# Patient Record
Sex: FEMALE | Race: WHITE | NOT HISPANIC OR LATINO | Employment: FULL TIME | URBAN - METROPOLITAN AREA
[De-identification: names, ages, dates, MRNs, and addresses within clinical notes are randomized per-mention and may not be internally consistent; named-entity substitution may affect disease eponyms.]

---

## 2021-04-26 ENCOUNTER — ANNUAL EXAM (OUTPATIENT)
Dept: OBGYN CLINIC | Facility: CLINIC | Age: 51
End: 2021-04-26
Payer: COMMERCIAL

## 2021-04-26 VITALS
WEIGHT: 153 LBS | HEIGHT: 67 IN | DIASTOLIC BLOOD PRESSURE: 60 MMHG | BODY MASS INDEX: 24.01 KG/M2 | SYSTOLIC BLOOD PRESSURE: 100 MMHG

## 2021-04-26 DIAGNOSIS — Z12.4 CERVICAL CANCER SCREENING: ICD-10-CM

## 2021-04-26 DIAGNOSIS — Z12.31 ENCOUNTER FOR SCREENING MAMMOGRAM FOR MALIGNANT NEOPLASM OF BREAST: ICD-10-CM

## 2021-04-26 DIAGNOSIS — Z01.419 ROUTINE GYNECOLOGICAL EXAMINATION: Primary | ICD-10-CM

## 2021-04-26 DIAGNOSIS — Z30.431 SURVEILLANCE OF INTRAUTERINE CONTRACEPTIVE DEVICE: ICD-10-CM

## 2021-04-26 DIAGNOSIS — Z80.3 FAMILY HISTORY OF BREAST CANCER IN FIRST DEGREE RELATIVE: ICD-10-CM

## 2021-04-26 PROCEDURE — S0612 ANNUAL GYNECOLOGICAL EXAMINA: HCPCS | Performed by: OBSTETRICS & GYNECOLOGY

## 2021-04-26 RX ORDER — MESALAMINE 1.2 G/1
1200 TABLET, DELAYED RELEASE ORAL
COMMUNITY

## 2021-04-26 RX ORDER — TRIAMCINOLONE ACETONIDE 1 MG/G
CREAM TOPICAL
COMMUNITY
Start: 2021-04-07

## 2021-04-26 RX ORDER — CHOLECALCIFEROL (VITAMIN D3) 1250 MCG
1 CAPSULE ORAL WEEKLY
COMMUNITY
Start: 2021-03-29

## 2021-04-26 NOTE — PROGRESS NOTES
Pt w  IUD   No menses  Some night sweats, no vaginal dryness  Mammogram  WNL  +  Wt gain in abdominal area   [address] [facility], [address], [facility], [address]    ASSESSMENT/PLAN: Bunny Clinton is a 52 y o   who presents for annual gynecologic exam     Encounter for routine gynecologic examination  - Routine well woman exam completed today  - Cervical Cancer Screening: Current ASCCP Guidelines reviewed  Last Pap: 2018   Next Pap Due: today  - HPV Vaccination status: Not immunized  - STI screening offered including HIV testing:   - Contraceptive counseling discussed  Current  IUD liletta    - Breast Cancer Screening: Last Mammogram 3/2021 per pt  - Colorectal cancer screening was not ordered  - The following were reviewed in today's visit: breast self exam, mammography screening ordered, menopause, exercise and healthy diet    Additional problems addressed during this visit:  1  Routine gynecological examination    2  Encounter for screening mammogram for malignant neoplasm of breast  -     Mammo screening bilateral w 3d & cad; Future; Expected date: 2022    3  Family history of breast cancer in first degree relative    4  Surveillance of intrauterine contraceptive device    5  Cervical cancer screening  -     IGP, Aptima HPV, Rfx 16/18,45    Sister w  Breast  Cancer  tx at  [facility] , Bilateral mastectomy, negative genetic screening patient aware of ability to have genetic screening  IUD in place no menses patient not sure she has menopause encouraged to leave the IUD in  Breast awareness healthy diet and exercise  CC:  Annual Gynecologic Examination    HPI: Bunny Clinton is a 52 y o   who presents for annual gynecologic examination  HPI    The following portions of the patient's history were reviewed and updated as appropriate: She  has a past medical history of Breast cyst, Papanicolaou smear (2018), Psoriasis, and Ulcerative colitis ([facility] [address] )    She has a past surgical history that includes Mammo (historical) (3/18/2021, 3/10/2020); Colonoscopy (2017); and Uterine fibroid surgery  Her family history includes Breast cancer (age of onset: 43) in her sister  She  reports that she has never smoked  She has never used smokeless tobacco  She reports current alcohol use  She reports that she does not use drugs  Current Outpatient Medications   Medication Sig Dispense Refill    Cholecalciferol (Vitamin D3) 1 25 MG (89545 UT) CAPS Take 1 capsule by mouth once a week      Levonorgestrel (LILETTA) 19 5 MCG/DAY IUD IUD 1 each by Intrauterine route once      mesalamine (LIALDA) 1 2 g EC tablet Take 1,200 mg by mouth daily with breakfast      triamcinolone (KENALOG) 0 1 % cream APPLY TO AFFECTED AREAS TWICE A DAY X 2 WEEKS AS NEEDED       No current facility-administered medications for this visit  She has No Known Allergies       Review of Systems:  All systems normal except as noted in HPI          Objective:  /60   Ht 5' 7" (1 702 m)   Wt 69 4 kg (153 lb)   BMI 23 96 kg/m²    Physical Exam  Vitals signs and nursing note reviewed  Constitutional:       Appearance: Normal appearance  HENT:      Head: Normocephalic  Neck:      Musculoskeletal: Neck supple  Cardiovascular:      Rate and Rhythm: Normal rate and regular rhythm  Pulmonary:      Effort: Pulmonary effort is normal       Breath sounds: Normal breath sounds  Abdominal:      General: Abdomen is flat  Bowel sounds are normal       Palpations: Abdomen is soft  Genitourinary:     General: Normal vulva  Vagina: Normal       Cervix: Normal       Uterus: Normal        Adnexa: Right adnexa normal       Rectum: Normal       Comments: IUD string in OS  Musculoskeletal: Normal range of motion  Skin:     General: Skin is warm and dry  Neurological:      Mental Status: She is alert     Psychiatric:         Mood and Affect: Mood normal

## 2021-04-29 LAB
CYTOLOGIST CVX/VAG CYTO: NORMAL
DX ICD CODE: NORMAL
HPV I/H RISK 4 DNA CVX QL PROBE+SIG AMP: NEGATIVE
OTHER STN SPEC: NORMAL
PATH REPORT.FINAL DX SPEC: NORMAL
SL AMB NOTE:: NORMAL
SL AMB SPECIMEN ADEQUACY: NORMAL
SL AMB TEST METHODOLOGY: NORMAL

## 2021-08-18 ENCOUNTER — VBI (OUTPATIENT)
Dept: ADMINISTRATIVE | Facility: OTHER | Age: 51
End: 2021-08-18

## 2021-08-18 NOTE — TELEPHONE ENCOUNTER
Upon review of the In Basket request we were able to locate, review, and update the patient chart as requested for Mammogram     Any additional questions or concerns should be emailed to the Practice Liaisons via [email or web address] email, please do not reply via In Basket      Thank you  Tarik Estrada

## 2023-12-05 ENCOUNTER — TELEPHONE (OUTPATIENT)
Dept: OBGYN CLINIC | Facility: CLINIC | Age: 53
End: 2023-12-05

## 2023-12-05 DIAGNOSIS — Z12.31 ENCOUNTER FOR SCREENING MAMMOGRAM FOR MALIGNANT NEOPLASM OF BREAST: Primary | ICD-10-CM

## 2023-12-05 NOTE — TELEPHONE ENCOUNTER
Patient left v/m requesting a mammogram before her next annual appointment on 2/26/24. Mammogram order placed. Spoke with patient and informed her order is placed and she denies any breast problems at this time. She verbalized understanding and requests to have mammogram sent to [facility]. Faxed script to [facility] scheduling. Received fax confirmation.

## 2024-01-15 DIAGNOSIS — Z12.31 ENCOUNTER FOR SCREENING MAMMOGRAM FOR MALIGNANT NEOPLASM OF BREAST: ICD-10-CM

## 2024-02-26 ENCOUNTER — ANNUAL EXAM (OUTPATIENT)
Dept: OBGYN CLINIC | Facility: CLINIC | Age: 54
End: 2024-02-26
Payer: COMMERCIAL

## 2024-02-26 VITALS
WEIGHT: 153 LBS | HEIGHT: 67 IN | SYSTOLIC BLOOD PRESSURE: 116 MMHG | DIASTOLIC BLOOD PRESSURE: 70 MMHG | BODY MASS INDEX: 24.01 KG/M2

## 2024-02-26 DIAGNOSIS — Z12.31 ENCOUNTER FOR SCREENING MAMMOGRAM FOR MALIGNANT NEOPLASM OF BREAST: ICD-10-CM

## 2024-02-26 DIAGNOSIS — Z01.419 ENCOUNTER FOR GYNECOLOGICAL EXAMINATION WITHOUT ABNORMAL FINDING: Primary | ICD-10-CM

## 2024-02-26 DIAGNOSIS — Z30.09 CONTRACEPTIVE EDUCATION: ICD-10-CM

## 2024-02-26 DIAGNOSIS — Z80.3 FAMILY HISTORY OF BREAST CANCER: ICD-10-CM

## 2024-02-26 DIAGNOSIS — Z30.431 IUD CHECK UP: ICD-10-CM

## 2024-02-26 DIAGNOSIS — Z12.4 SCREENING FOR CERVICAL CANCER: ICD-10-CM

## 2024-02-26 PROCEDURE — S0612 ANNUAL GYNECOLOGICAL EXAMINA: HCPCS | Performed by: OBSTETRICS & GYNECOLOGY

## 2024-02-26 NOTE — PROGRESS NOTES
[facility] - [address], [address], [place] [address]    ASSESSMENT/PLAN: Katherine Borjas is a 53 y.o.  who presents for annual gynecologic exam.    Encounter for routine gynecologic examination  - Routine well woman exam completed today.  - Cervical Cancer Screening: Current ASCCP Guidelines reviewed. Last Pap: 2021 . Next Pap Due: today   - Contraceptive counseling discussed.  Current contraception: none:liletta  2019   - Breast Cancer Screening: Last Mammogram 01/10/2024,   - Colorectal cancer screening was not ordered. One year    - The following were reviewed in today's visit: breast self exam, mammography screening ordered, adequate intake of calcium and vitamin D, exercise, healthy diet, and colonoscopy discussed and ordered    Additional problems addressed during this visit:  1. Encounter for gynecological examination without abnormal finding    2. Encounter for screening mammogram for malignant neoplasm of breast  -     Mammo screening bilateral w 3d & cad; Future    3. Screening for cervical cancer  -     IGP, Aptima HPV, Rfx 16/18,45    4. Contraceptive education    5. IUD check up  Comments:  iud string in os no bleeding can  remain in till  [id number]    6. Family history of breast cancer  Comments:  sister neg  genetics pt aware of availability of genetics        CC:  Annual Gynecologic Examination    HPI: Katherine Borjas is a 53 y.o.  who presents for annual gynecologic examination.  52 yo  here for well woman exam.  Hx of endometrial ablation.   No bleeding  liletta iud in .  Dw pt  to   leave in place until    and to report increase in bleeding.       The following portions of the patient's history were reviewed and updated as appropriate: She  has a past medical history of Breast cyst, Papanicolaou smear (), Psoriasis, and Ulcerative colitis (HCC).  She  has a past surgical history that includes Mammo (historical); Colonoscopy (2017); and Uterine  "fibroid surgery.  Her family history includes Breast cancer (age of onset: 42) in her sister.  She  reports that she has never smoked. She has never used smokeless tobacco. She reports current alcohol use. She reports that she does not use drugs.  Current Outpatient Medications   Medication Sig Dispense Refill   • Cholecalciferol (Vitamin D3) 1.25 MG (78493 UT) CAPS Take 1 capsule by mouth once a week     • Levonorgestrel (LILETTA) 19.5 MCG/DAY IUD IUD 1 each by Intrauterine route once     • mesalamine (LIALDA) 1.2 g EC tablet Take 1,200 mg by mouth daily with breakfast     • triamcinolone (KENALOG) 0.1 % cream APPLY TO AFFECTED AREAS TWICE A DAY X 2 WEEKS AS NEEDED (Patient not taking: Reported on 2/26/2024)       No current facility-administered medications for this visit.     She has No Known Allergies..    Review of Systems:  All systems normal except as noted in HPI          Objective:  /70 (BP Location: Left arm, Patient Position: Sitting, Cuff Size: Standard)   Ht 5' 7\" (1.702 m)   Wt 69.4 kg (153 lb)   BMI 23.96 kg/m²    Physical Exam  Vitals and nursing note reviewed.   Constitutional:       Appearance: Normal appearance.   HENT:      Head: Normocephalic.   Cardiovascular:      Rate and Rhythm: Normal rate and regular rhythm.   Pulmonary:      Effort: Pulmonary effort is normal.      Breath sounds: Normal breath sounds.   Chest:      Chest wall: No mass, lacerations, swelling, tenderness or edema.   Breasts:     Elvin Score is 4.      Breasts are symmetrical.      Right: Normal. No swelling, bleeding, inverted nipple, mass, nipple discharge, skin change or tenderness.      Left: No swelling, bleeding, inverted nipple, mass, nipple discharge, skin change or tenderness.   Abdominal:      General: Abdomen is flat. Bowel sounds are normal.      Palpations: Abdomen is soft.   Genitourinary:     General: Normal vulva.      Exam position: Lithotomy position.      Pubic Area: No rash.       Elvin stage " (genital): 4.      Labia:         Right: No rash, tenderness or lesion.         Left: No rash, tenderness or lesion.       Urethra: No urethral pain, urethral swelling or urethral lesion.      Vagina: Normal.      Cervix: Normal. No cervical motion tenderness or discharge.      Uterus: Normal.       Adnexa: Right adnexa normal and left adnexa normal.      Rectum: Normal.      Comments: Iud string in os    Musculoskeletal:         General: Normal range of motion.      Cervical back: Neck supple.   Lymphadenopathy:      Upper Body:      Right upper body: No supraclavicular, axillary or pectoral adenopathy.      Left upper body: No supraclavicular, axillary or pectoral adenopathy.      Lower Body: No right inguinal adenopathy. No left inguinal adenopathy.   Skin:     General: Skin is warm and dry.   Neurological:      Mental Status: She is alert.   Psychiatric:         Mood and Affect: Mood normal.

## 2024-03-06 LAB
CYTOLOGIST CVX/VAG CYTO: ABNORMAL
DX ICD CODE: ABNORMAL
HPV GENOTYPE REFLEX: ABNORMAL
HPV I/H RISK 4 DNA CVX QL PROBE+SIG AMP: POSITIVE
HPV16 DNA CVX QL PROBE+SIG AMP: POSITIVE
HPV18+45 E6+E7 MRNA CVX QL NAA+PROBE: NEGATIVE
Lab: ABNORMAL
OTHER STN SPEC: ABNORMAL
PATH REPORT.FINAL DX SPEC: ABNORMAL
SL AMB NOTE:: ABNORMAL
SL AMB SPECIMEN ADEQUACY: ABNORMAL
SL AMB TEST METHODOLOGY: ABNORMAL

## 2024-04-26 PROBLEM — Z12.4 SCREENING FOR CERVICAL CANCER: Status: RESOLVED | Noted: 2024-02-26 | Resolved: 2024-04-26

## 2024-04-26 PROBLEM — Z01.419 ENCOUNTER FOR GYNECOLOGICAL EXAMINATION WITHOUT ABNORMAL FINDING: Status: RESOLVED | Noted: 2024-02-26 | Resolved: 2024-04-26

## 2025-03-02 NOTE — PROGRESS NOTES
[facility] - [address], [address], [place] [address]    ASSESSMENT/PLAN: Katherine Borjas is a 54 y.o.  who presents for annual gynecologic exam.    Encounter for routine gynecologic examination  - Routine well woman exam completed today.  - Cervical Cancer Screening: Current ASCCP Guidelines reviewed. Last Pap: 2024 Next Pap Due: today  + HR Hpv in   - Contraceptive counseling discussed.  Current contraception: condoms: liletta iud   - Breast Cancer Screening: Last Mammogram 01/10/2024,   - Colorectal cancer screening was not ordered.  Not  due   on 3 year  hx of ulcerative colitis   - The following were reviewed in today's visit: breast self exam, mammography screening ordered, use and side effects of HRT, family planning choices, menopause, osteoporosis, adequate intake of calcium and vitamin D, exercise, healthy diet, and colonoscopy discussed and ordered    Additional problems addressed during this visit:  1. Encounter for gynecological examination without abnormal finding  2. Encounter for screening mammogram for malignant neoplasm of breast  -     Mammo screening bilateral w 3d and cad; Future  3. IUD check up  Comments:  string in os  4. Family history of breast cancer  Comments:  Referral to   Dr Moreno  5. Screening for cervical cancer  -     IGP, Aptima HPV, Rfx 16/18,45  6. Human papillomavirus (HPV) type 16 DNA detected in cervical specimen  -     IGP, Aptima HPV, Rfx 16/18,45  7. Menopausal symptoms  Comments:  R+B of  HRT  dw pt one toone. Vivelle dot 0.5 apply to clean skin 2 x weekly.  May have bdg.  Reviewed ACHES. DW pt Dr Ayala Grover  Orders:  -     estradiol (Vivelle-Dot) 0.05 MG/24HR; Place 1 patch on the skin 2 (two) times a week      CC:  Annual Gynecologic Examination    HPI: Katherine Borjas is a 54 y.o.  who presents for annual gynecologic examination.  53 yo  here for wellness exam.   + liletta iud w  no   bleeding.   + hx of  HPV 16  + pap  "in 2024  repeat today.    + hot flashes ,low libido , wt gain  and   night sweats.  Friends are on compounded creams w + results.       The following portions of the patient's history were reviewed and updated as appropriate: She  has a past medical history of Breast cyst, Papanicolaou smear (2024), Psoriasis, and Ulcerative colitis (HCC).  She  has a past surgical history that includes Mammo (historical); Colonoscopy (2023); and Uterine fibroid surgery.  Her family history includes Breast cancer (age of onset: 42) in her sister.  She  reports that she has never smoked. She has never used smokeless tobacco. She reports current alcohol use. She reports that she does not use drugs.  Current Outpatient Medications   Medication Sig Dispense Refill   • Cholecalciferol (Vitamin D3) 1.25 MG (13197 UT) CAPS Take 1 capsule by mouth once a week     • estradiol (Vivelle-Dot) 0.05 MG/24HR Place 1 patch on the skin 2 (two) times a week 8 patch 3   • Levonorgestrel (LILETTA) 19.5 MCG/DAY IUD IUD 1 each by Intrauterine route once     • mesalamine (LIALDA) 1.2 g EC tablet Take 1,200 mg by mouth daily with breakfast       No current facility-administered medications for this visit.     She has no known allergies..    Review of Systems:  All systems normal except as noted in HPI          Objective:  /72 (BP Location: Left arm, Patient Position: Sitting, Cuff Size: Standard)   Ht 5' 7\" (1.702 m)   Wt 70.8 kg (156 lb)   BMI 24.43 kg/m²    Physical Exam  Vitals and nursing note reviewed.   Constitutional:       Appearance: Normal appearance.   HENT:      Head: Normocephalic.   Cardiovascular:      Rate and Rhythm: Normal rate and regular rhythm.      Pulses: Normal pulses.      Heart sounds: Normal heart sounds.   Pulmonary:      Effort: Pulmonary effort is normal.      Breath sounds: Normal breath sounds.   Chest:      Chest wall: No mass, lacerations, swelling, tenderness or edema.   Breasts:     Elvin Score is 4.      " Breasts are symmetrical.      Right: Normal. No swelling, bleeding, inverted nipple, mass, nipple discharge, skin change or tenderness.      Left: No swelling, bleeding, inverted nipple, mass, nipple discharge, skin change or tenderness.   Abdominal:      General: Abdomen is flat. Bowel sounds are normal.      Palpations: Abdomen is soft.   Genitourinary:     General: Normal vulva.      Exam position: Lithotomy position.      Pubic Area: No rash.       Elvin stage (genital): 4.      Labia:         Right: No rash, tenderness or lesion.         Left: No rash, tenderness or lesion.       Urethra: No urethral pain, urethral swelling or urethral lesion.      Vagina: Normal.      Cervix: No cervical motion tenderness or discharge.      Uterus: Normal.       Adnexa: Right adnexa normal and left adnexa normal.      Rectum: Normal.      Comments: String in OS   Musculoskeletal:         General: Normal range of motion.      Cervical back: Normal range of motion and neck supple.   Lymphadenopathy:      Upper Body:      Right upper body: No supraclavicular, axillary or pectoral adenopathy.      Left upper body: No supraclavicular, axillary or pectoral adenopathy.      Lower Body: No right inguinal adenopathy. No left inguinal adenopathy.   Skin:     General: Skin is warm and dry.   Neurological:      General: No focal deficit present.      Mental Status: She is alert and oriented to person, place, and time.   Psychiatric:         Mood and Affect: Mood normal.         Behavior: Behavior normal.         Thought Content: Thought content normal.         Judgment: Judgment normal.

## 2025-03-03 ENCOUNTER — ANNUAL EXAM (OUTPATIENT)
Dept: OBGYN CLINIC | Facility: CLINIC | Age: 55
End: 2025-03-03
Payer: COMMERCIAL

## 2025-03-03 VITALS
HEIGHT: 67 IN | SYSTOLIC BLOOD PRESSURE: 118 MMHG | WEIGHT: 156 LBS | DIASTOLIC BLOOD PRESSURE: 72 MMHG | BODY MASS INDEX: 24.48 KG/M2

## 2025-03-03 DIAGNOSIS — Z12.4 SCREENING FOR CERVICAL CANCER: ICD-10-CM

## 2025-03-03 DIAGNOSIS — Z80.3 FAMILY HISTORY OF BREAST CANCER: ICD-10-CM

## 2025-03-03 DIAGNOSIS — R87.810 HUMAN PAPILLOMAVIRUS (HPV) TYPE 16 DNA DETECTED IN CERVICAL SPECIMEN: ICD-10-CM

## 2025-03-03 DIAGNOSIS — Z01.419 ENCOUNTER FOR GYNECOLOGICAL EXAMINATION WITHOUT ABNORMAL FINDING: Primary | ICD-10-CM

## 2025-03-03 DIAGNOSIS — Z12.31 ENCOUNTER FOR SCREENING MAMMOGRAM FOR MALIGNANT NEOPLASM OF BREAST: ICD-10-CM

## 2025-03-03 DIAGNOSIS — Z30.431 IUD CHECK UP: ICD-10-CM

## 2025-03-03 DIAGNOSIS — N95.1 MENOPAUSAL SYMPTOMS: ICD-10-CM

## 2025-03-03 PROCEDURE — S0612 ANNUAL GYNECOLOGICAL EXAMINA: HCPCS | Performed by: OBSTETRICS & GYNECOLOGY

## 2025-03-03 RX ORDER — ESTRADIOL 0.05 MG/D
1 PATCH, EXTENDED RELEASE TRANSDERMAL 2 TIMES WEEKLY
Qty: 8 PATCH | Refills: 3 | Status: SHIPPED | OUTPATIENT
Start: 2025-03-03

## 2025-03-07 LAB
CYTOLOGIST CVX/VAG CYTO: ABNORMAL
DX ICD CODE: ABNORMAL
HPV GENOTYPE REFLEX: ABNORMAL
HPV I/H RISK 4 DNA CVX QL PROBE+SIG AMP: POSITIVE
HPV16 DNA CVX QL PROBE+SIG AMP: POSITIVE
HPV18+45 E6+E7 MRNA CVX QL NAA+PROBE: NEGATIVE
OTHER STN SPEC: ABNORMAL
PATH REPORT.FINAL DX SPEC: ABNORMAL
SL AMB NOTE:: ABNORMAL
SL AMB SPECIMEN ADEQUACY: ABNORMAL
SL AMB TEST METHODOLOGY: ABNORMAL

## 2025-03-11 ENCOUNTER — RESULTS FOLLOW-UP (OUTPATIENT)
Dept: OBGYN CLINIC | Facility: CLINIC | Age: 55
End: 2025-03-11

## 2025-03-26 DIAGNOSIS — Z12.31 ENCOUNTER FOR SCREENING MAMMOGRAM FOR MALIGNANT NEOPLASM OF BREAST: ICD-10-CM

## 2025-04-02 PROBLEM — Z01.419 ENCOUNTER FOR GYNECOLOGICAL EXAMINATION WITHOUT ABNORMAL FINDING: Status: RESOLVED | Noted: 2025-03-03 | Resolved: 2025-04-02

## 2025-04-02 PROBLEM — Z12.4 SCREENING FOR CERVICAL CANCER: Status: RESOLVED | Noted: 2025-03-03 | Resolved: 2025-04-02

## 2025-04-14 NOTE — PROGRESS NOTES
Here for colposcopy PAP 2/26/2024 neg + HPV 16 repeat 3/2025 neg + HPV 16 prior 2021 & 2018 neg/neg HPV Has Liletta IUD     Colposcopy    Date/Time: 4/15/2025 8:30 AM    Performed by: VIKTORIA Fraser  Authorized by: VIKTORIA Fraser    Other Assisting Provider: Yes (comment)    Verbal consent obtained?: Yes    Risks and benefits: Risks, benefits and alternatives were discussed    Consent given by:  Patient  Time Out:     Time out performed at:  4/15/2025 8:34 AM  Patient states understanding of procedure being performed: Yes    Patient identity confirmed:  Verbally with patient  Pre-procedure:     Negative urine pregnancy test: yes      Prepped with: acetic acid    Indication:     Indications: Neg pap + HPV 16 x 2.  Procedure:     Procedure: Colposcopy w/ endocervical curettage      Cervix was cleansed with betadine: yes      Under colposcopic examination the transition zone was seen in entirety: yes (small transformation zone at os)      Monsel's solution was applied: no      Specimen(s) to pathology: yes    Post-procedure:     Findings comment:  Benign epithelium hazy white consistent with atrophy    Impression comment:  Normal    Patient tolerance of procedure:  Tolerated well, no immediate complications  Comments:      Take ibuprofen 6 hours from last dose with food for cramping. Light vaginal bleeding  is normal for several days. Call office with vaginal bleeding heavier than a normal menses. No sexual activity x 2 days.

## 2025-04-15 ENCOUNTER — PROCEDURE VISIT (OUTPATIENT)
Dept: OBGYN CLINIC | Facility: CLINIC | Age: 55
End: 2025-04-15
Payer: COMMERCIAL

## 2025-04-15 VITALS
WEIGHT: 156.8 LBS | HEIGHT: 67 IN | SYSTOLIC BLOOD PRESSURE: 120 MMHG | DIASTOLIC BLOOD PRESSURE: 72 MMHG | BODY MASS INDEX: 24.61 KG/M2

## 2025-04-15 DIAGNOSIS — Z32.02 NEGATIVE PREGNANCY TEST: ICD-10-CM

## 2025-04-15 DIAGNOSIS — R87.810 HUMAN PAPILLOMAVIRUS (HPV) TYPE 16 DNA DETECTED IN CERVICAL SPECIMEN: Primary | ICD-10-CM

## 2025-04-15 LAB — SL AMB POCT URINE HCG: NORMAL

## 2025-04-15 PROCEDURE — 57456 ENDOCERV CURETTAGE W/SCOPE: CPT | Performed by: NURSE PRACTITIONER

## 2025-04-15 PROCEDURE — 81025 URINE PREGNANCY TEST: CPT | Performed by: NURSE PRACTITIONER

## 2025-04-15 NOTE — PATIENT INSTRUCTIONS
Take ibuprofen 6 hours from last dose with food for cramping. Light vaginal bleeding  is normal for several days. Call office with vaginal bleeding heavier than a normal menses. No sexual activity x 2 days.

## 2025-04-22 LAB
PATH REPORT.SITE OF ORIGIN SPEC: NORMAL
PAYMENT PROCEDURE: NORMAL
SL AMB .: NORMAL

## 2025-04-23 ENCOUNTER — RESULTS FOLLOW-UP (OUTPATIENT)
Dept: OBGYN CLINIC | Facility: CLINIC | Age: 55
End: 2025-04-23